# Patient Record
Sex: MALE | Race: WHITE | NOT HISPANIC OR LATINO | URBAN - METROPOLITAN AREA
[De-identification: names, ages, dates, MRNs, and addresses within clinical notes are randomized per-mention and may not be internally consistent; named-entity substitution may affect disease eponyms.]

---

## 2020-01-21 ENCOUNTER — EMERGENCY (EMERGENCY)
Facility: HOSPITAL | Age: 55
LOS: 1 days | Discharge: HOME | End: 2020-01-21
Attending: EMERGENCY MEDICINE | Admitting: EMERGENCY MEDICINE
Payer: MEDICAID

## 2020-01-21 VITALS
WEIGHT: 154.98 LBS | OXYGEN SATURATION: 100 % | HEART RATE: 97 BPM | RESPIRATION RATE: 18 BRPM | TEMPERATURE: 98 F | HEIGHT: 66 IN | DIASTOLIC BLOOD PRESSURE: 89 MMHG | SYSTOLIC BLOOD PRESSURE: 160 MMHG

## 2020-01-21 PROCEDURE — 99283 EMERGENCY DEPT VISIT LOW MDM: CPT

## 2020-01-21 PROCEDURE — 99285 EMERGENCY DEPT VISIT HI MDM: CPT

## 2020-01-21 NOTE — ED ADULT NURSE NOTE - OBJECTIVE STATEMENT
Patient aaox4, walks with steady gait. Shes he felt excited about a girl earlier today. He denies chest pain, sob, dizziness, numbness, tingling.
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
## 2020-01-21 NOTE — ED ADULT NURSE NOTE - CHPI ED NUR SYMPTOMS NEG
no fever/no vomiting/no dizziness/no loss of consciousness/no confusion/no nausea/no numbness/no blurred vision/no change in level of consciousness/no weakness

## 2020-01-21 NOTE — ED ADULT NURSE NOTE - NSIMPLEMENTINTERV_GEN_ALL_ED
Implemented All Universal Safety Interventions:  [place] to call system. Call bell, personal items and telephone within reach. Instruct patient to call for assistance. Room bathroom lighting operational. Non-slip footwear when patient is off stretcher. Physically safe environment: no spills, clutter or unnecessary equipment. Stretcher in lowest position, wheels locked, appropriate side rails in place.

## 2020-01-21 NOTE — ED PROVIDER NOTE - PATIENT PORTAL LINK FT
You can access the FollowMyHealth Patient Portal offered by [facility] by registering at the following website: [email or web address]. By joining [employer]’s FollowMyHealth portal, you will also be able to view your health information using other applications (apps) compatible with our system.

## 2020-01-21 NOTE — ED PROVIDER NOTE - CLINICAL SUMMARY MEDICAL DECISION MAKING FREE TEXT BOX
symptoms appear induced by marijuana/ alcohol but is not currently intoxicated.  denies si/hi/hallucinations.  does not appear acutely psychotic.  has f/u with psychiatrist in office to discuss his meds.  counseled not to smoke/drink/drive.  return precautions discussed

## 2020-01-21 NOTE — ED PROVIDER NOTE - NSFOLLOWUPINSTRUCTIONS_ED_ALL_ED_FT
DO NOT DRINK ALCOHOL OR USE MARIJUANA AND DRIVE.  Please see your primary care provider in 2-3 days.  Call for appointment.  If you have any problems with followup, please call the ED Referral Coordinator at [phone].  Return to the ER if symptoms worsen or other concerns.    Alcohol Abuse    Alcohol intoxication occurs when the amount of alcohol that a person has consumed impairs his or her ability to mentally and physically function. Chronic alcohol consumption can also lead to a variety of health issues including neurological disease, stomach disease, heart disease, liver disease, etc. Do not drive after drinking alcohol. Drinking enough alcohol to end up in an Emergency Room suggests you may have an alcohol abuse problem. Seek help at a drug addiction center.    SEEK IMMEDIATE MEDICAL CARE IF YOU HAVE ANY OF THE FOLLOWING SYMPTOMS: seizures, vomiting blood, blood in your stool, lightheadedness/dizziness, or becoming shaky to tremulous when you stop drinking.    Substance Use Disorder  Substance use disorder occurs when a person's repeated use of drugs or alcohol interferes with his or her ability to be productive. This disorder can cause problems with mental and physical health. It can affect your ability to have healthy relationships, and it can keep you from being able to meet your responsibilities at work, home, or school. It can also lead to addiction, which is a condition in which the person cannot stop using the substance consistently for a period of time.    The most commonly abused substances include:  Alcohol.  Tobacco.  Marijuana.  Stimulants, such as cocaine and methamphetamine.  Hallucinogens, such as LSD and PCP.  Opioids, such as some prescription pain medicines and heroin.  What are the causes?  This condition may develop due to many complex social, psychological, or physical reasons, such as:  Stress.  Abuse.  Peer pressure.  Anxiety or depression.  What increases the risk?  This condition is more likely to develop in people who:  Use substances to cope with stress.  Have been abused.  Have a mental health disorder, such as depression.  Have a family history of substance use disorder.  What are the signs or symptoms?  Symptoms of this condition include:  Using the substance for longer periods of time or at a higher dosage than what is normal or intended.  Having a lasting desire to use the substance.  Being unable to slow down or stop your use of the substance.  Spending an abnormal amount of time getting the substance, using the substance, or recovering from using the substance.  Craving the substance.  Using the substance in a way that interferes with work, school, social activities, and personal relationships.  Using the substance even after having negative consequences, such as:  Health problems.  Legal or financial troubles.  Job loss.  Broken relationships.  Needing more and more of the substance to get the same effect (developing tolerance).  Experiencing unpleasant symptoms if you do not use the substance (withdrawal).  Using the substance to avoid withdrawal.  How is this diagnosed?  This condition may be diagnosed based on:  A physical exam.  Your history of substance use.  Your symptoms. This includes:  How substance use affects your life.  Changes in personality, behaviors, and mood.  Having at least two symptoms of substance use disorder within a 12-month period.  Health issues related to substance use, such as liver damage, shortness of breath, fatigue, cough, or heart problems.  Blood or urine tests to screen for alcohol and drugs.  How is this treated?  ImageThis condition may be treated by:  Stopping substance use safely. This may require taking medicines and being closely observed for several days.  Taking part in group and individual counseling from mental health providers who help people with substance use disorder.  Staying at a live-in (residential) treatment center for several days or weeks.  Attending daily counseling sessions at a treatment center.  Taking medicine as told by your health care provider:  To ease symptoms and prevent complications during withdrawal.  To treat other mental health issues, such as depression or anxiety.  To block cravings by causing the same effects as the substance.  To block the effects of the substance or replace good sensations with unpleasant ones.  Going to a support group to share your experience with others who are going through the same thing.  Recovery can be a long process. Many people who undergo treatment start using the substance again after stopping (relapse). If you relapse, that does not mean that treatment will not work.    Follow these instructions at home:    Take over-the-counter and prescription medicines only as told by your health care provider.  Do not use any drugs or alcohol.  Attend support groups as needed. These groups, including 12-step programs like Alcoholics Anonymous and Narcotics Anonymous, are an important part of long-term recovery for many people.  Keep all follow-up visits as told by your health care providers. This is important. This includes continuing to work with therapists and support groups.  Contact a health care provider if:  You cannot take your medicines as told.  Your symptoms get worse.  You have trouble resisting the urge to use drugs or alcohol.  Get help right away if you:  Relapse.  Think that you may have taken too much of a drug. The hotline of the National Poison Control Center is (772) 768-8072.  Have signs of an overdose. Symptoms include:  Chest pain.  Confusion.  Sleepiness or difficulty staying awake.  Slowed breathing.  Nausea or vomiting.  A seizure.  Have serious thoughts about hurting yourself or someone else.  Drug overdose is an emergency. Do not wait to see if the symptoms will go away. Get medical help right away. Call your local emergency services (340 in the [place] Do not drive yourself to the hospital.     If you ever feel like you may hurt yourself or others, or have thoughts about taking your own life, get help right away. You can go to your nearest emergency department or call:   Your local emergency services (058 in the [place]   A suicide crisis helpline, such as the National Suicide Prevention Lifeline at 1-103.358.4230. This is open 24 hours a day.   Summary  Substance use disorder occurs when a person's repeated use of drugs or alcohol interferes with his or her ability to be productive. It can affect your ability to have healthy relationships, keep you from being able to meet your responsibilities at work, home, or school, and lead to addiction.  Taking part in group and individual counseling from mental health providers is one possible treatment for people with substance use disorder.  Recovery can be a long process. Many people who undergo treatment start using the substance again after stopping (relapse). A relapse does not mean that treatment will not work.  Attend support groups as needed, such as Alcoholics Anonymous and Narcotics Anonymous. These groups are an important part of long-term recovery for many people.  This information is not intended to replace advice given to you by your health care provider. Make sure you discuss any questions you have with your health care provider.

## 2020-01-27 DIAGNOSIS — F10.10 ALCOHOL ABUSE, UNCOMPLICATED: ICD-10-CM

## 2020-01-27 DIAGNOSIS — F12.10 CANNABIS ABUSE, UNCOMPLICATED: ICD-10-CM

## 2020-01-27 DIAGNOSIS — F17.200 NICOTINE DEPENDENCE, UNSPECIFIED, UNCOMPLICATED: ICD-10-CM

## 2020-01-27 DIAGNOSIS — R41.82 ALTERED MENTAL STATUS, UNSPECIFIED: ICD-10-CM

## 2020-02-28 PROBLEM — K46.9 UNSPECIFIED ABDOMINAL HERNIA WITHOUT OBSTRUCTION OR GANGRENE: Chronic | Status: ACTIVE | Noted: 2020-01-21

## 2020-03-06 ENCOUNTER — APPOINTMENT (OUTPATIENT)
Dept: FAMILY MEDICINE | Facility: CLINIC | Age: 55
End: 2020-03-06
Payer: SELF-PAY

## 2020-03-06 VITALS
TEMPERATURE: 98.4 F | RESPIRATION RATE: 12 BRPM | WEIGHT: 150 LBS | HEART RATE: 70 BPM | SYSTOLIC BLOOD PRESSURE: 118 MMHG | HEIGHT: 67 IN | OXYGEN SATURATION: 99 % | DIASTOLIC BLOOD PRESSURE: 78 MMHG | BODY MASS INDEX: 23.54 KG/M2

## 2020-03-06 DIAGNOSIS — Z78.9 OTHER SPECIFIED HEALTH STATUS: ICD-10-CM

## 2020-03-06 DIAGNOSIS — Z82.49 FAMILY HISTORY OF ISCHEMIC HEART DISEASE AND OTHER DISEASES OF THE CIRCULATORY SYSTEM: ICD-10-CM

## 2020-03-06 DIAGNOSIS — Z87.19 PERSONAL HISTORY OF OTHER DISEASES OF THE DIGESTIVE SYSTEM: ICD-10-CM

## 2020-03-06 DIAGNOSIS — F17.200 NICOTINE DEPENDENCE, UNSPECIFIED, UNCOMPLICATED: ICD-10-CM

## 2020-03-06 DIAGNOSIS — F19.90 OTHER PSYCHOACTIVE SUBSTANCE USE, UNSPECIFIED, UNCOMPLICATED: ICD-10-CM

## 2020-03-06 DIAGNOSIS — I86.1 SCROTAL VARICES: ICD-10-CM

## 2020-03-06 DIAGNOSIS — Z80.42 FAMILY HISTORY OF MALIGNANT NEOPLASM OF PROSTATE: ICD-10-CM

## 2020-03-06 DIAGNOSIS — Z83.6 FAMILY HISTORY OF OTHER DISEASES OF THE RESPIRATORY SYSTEM: ICD-10-CM

## 2020-03-06 DIAGNOSIS — Z72.89 OTHER PROBLEMS RELATED TO LIFESTYLE: ICD-10-CM

## 2020-03-06 DIAGNOSIS — Z00.00 ENCOUNTER FOR GENERAL ADULT MEDICAL EXAMINATION W/OUT ABNORMAL FINDINGS: ICD-10-CM

## 2020-03-06 DIAGNOSIS — F17.210 NICOTINE DEPENDENCE, CIGARETTES, UNCOMPLICATED: ICD-10-CM

## 2020-03-06 PROCEDURE — 99386 PREV VISIT NEW AGE 40-64: CPT | Mod: 25

## 2020-03-06 PROCEDURE — 99406 BEHAV CHNG SMOKING 3-10 MIN: CPT

## 2020-03-06 PROCEDURE — G0443: CPT

## 2020-03-23 PROBLEM — Z83.6 FAMILY HISTORY OF ALLERGIC RHINITIS: Status: ACTIVE | Noted: 2020-03-23

## 2020-03-23 PROBLEM — Z87.19 HISTORY OF INGUINAL HERNIA: Status: RESOLVED | Noted: 2020-03-23 | Resolved: 2020-03-23

## 2020-03-23 PROBLEM — Z00.00 PHYSICAL EXAM: Status: ACTIVE | Noted: 2020-03-23

## 2020-03-23 PROBLEM — I86.1 LEFT VARICOCELE: Status: RESOLVED | Noted: 2020-03-23 | Resolved: 2020-03-23

## 2020-03-23 PROBLEM — Z00.00 ENCOUNTER FOR PREVENTIVE HEALTH EXAMINATION: Status: ACTIVE | Noted: 2020-03-06

## 2020-03-23 PROBLEM — Z80.42 FAMILY HISTORY OF MALIGNANT NEOPLASM OF PROSTATE: Status: ACTIVE | Noted: 2020-03-23

## 2020-03-23 PROBLEM — F17.200 SMOKER: Status: ACTIVE | Noted: 2020-03-23

## 2020-03-23 PROBLEM — Z72.89 ALCOHOL USE: Status: ACTIVE | Noted: 2020-03-23

## 2020-03-23 PROBLEM — Z82.49 FAMILY HISTORY OF ANGINA PECTORIS: Status: ACTIVE | Noted: 2020-03-23

## 2020-03-23 NOTE — COUNSELING
[Behavioral health counseling provided] : Behavioral health counseling provided [Sleep ___ hours/day] : Sleep [unfilled] hours/day [Engage in a relaxing activity] : Engage in a relaxing activity [Plan in advance] : Plan in advance [Risk of tobacco use and health benefits of smoking cessation discussed] : Risk of tobacco use and health benefits of smoking cessation discussed [Cessation strategies including cessation program discussed] : Cessation strategies including cessation program discussed [Use of nicotine replacement therapies and other medications discussed] : Use of nicotine replacement therapies and other medications discussed [Encouraged to pick a quit date and identify support needed to quit] : Encouraged to pick a quit date and identify support needed to quit [Tobacco Use Cessation Intermediate Greater Than 3 Minutes Up to 10 Minutes] : Tobacco Use Cessation Intermediate Greater Than 3 Minutes Up to 10 Minutes [Financial issues] : Financial issues [Patient motivation] : Patient motivation [Good understanding] : Patient has a good understanding of lifestyle changes and steps needed to achieve self management goal [Hazards of at-risk alcohol use discussed] : Hazards of at-risk alcohol use discussed [Strategies to reduce or eliminate alcohol use discussed] : Strategies to reduce or eliminate alcohol use discussed [Support options provided] : Support options provided [Quit Drinking] : Quit Drinking [Participate in Treatment Program] : Participate in treatment program [Willing to Quit Smoking] : Not willing to quit smoking [FreeTextEntry1] : 15

## 2020-03-23 NOTE — PLAN
[FreeTextEntry1] : 1-  Physical exam: done\par \par 2-  Smoker:  smoking cessation counseling given.  Discussed the risks of tobacco use and the benefits of smoking cessation. Encouraged to go to smoking cessation classes. Information given for [facility] cessation classes:  [facility], [phone], [facility] [phone],  [facility], [phone]. \par \par 3-  Alcohol and drugs misused:  Counseling given for alcohol and drug used.  Discussed the risks of using drugs/alcohol, and the benefits to stopping drugs/alcohol.  Encouraged to go to drugs and alcohol cessation classes.  Information given for AA meetings, [facility] Substance Abuse Hot line [phone], [facility] crisis [phone], [facility] [employer] [facility], [phone].\par \par 4-  Health Maintenance:  Discussed Life style modificaiton, healthy diet, low salts, fats, sweets, less juices, sodas,  fried food, cheese, more water, fruits, vegetables.  Exercise, walking/running 30-60 minutes/day.  Encouraged to sleep 8 hours/night, get plenty of rest.  Discussed dental care, floss/brush after each meals, dental check every 6 months.   \par \par 5-  Depression/anxiety:  Continue with psych meds, continue to f/u with psych.\par \par 6-  F/U in 10-11 wks or prn\par

## 2020-03-23 NOTE — HISTORY OF PRESENT ILLNESS
[FreeTextEntry1] : Physical Exam [de-identified] : 54 y.o male with PMHx of tobacco misused, alcohol/drug used, vericocelle left testicle, hernia right groin.  Here today for Physical exam to be link to [employer] programs.  Denies cp, palpitation, sob, dizziness, edema, n//v.  Reported does not have complaints at this time.

## 2020-03-23 NOTE — REVIEW OF SYSTEMS
[Fever] : no fever [Chills] : no chills [Fatigue] : no fatigue [Hot Flashes] : no hot flashes [Night Sweats] : no night sweats [Recent Change In Weight] : ~T no recent weight change [Discharge] : no discharge [Pain] : no pain [Redness] : no redness [Dryness] : no dryness [Vision Problems] : no vision problems [Itching] : no itching [Earache] : no earache [Hearing Loss] : no hearing loss [Nosebleeds] : no nosebleeds [Postnasal Drip] : no postnasal drip [Nasal Discharge] : no nasal discharge [Sore Throat] : no sore throat [Hoarseness] : no hoarseness [Chest Pain] : no chest pain [Palpitations] : no palpitations [Claudication] : no  leg claudication [Lower Ext Edema] : no lower extremity edema [Orthopena] : no orthopnea [Paroxysmal Nocturnal Dyspnea] : no paroxysmal nocturnal dyspnea [Shortness Of Breath] : no shortness of breath [Wheezing] : no wheezing [Cough] : no cough [Dyspnea on Exertion] : not dyspnea on exertion [Abdominal Pain] : no abdominal pain [Nausea] : no nausea [Constipation] : no constipation [Diarrhea] : no diarrhea [Vomiting] : no vomiting [Heartburn] : no heartburn [Melena] : no melena [Dysuria] : no dysuria [Incontinence] : no incontinence [Hesitancy] : no hesitancy [Nocturia] : no nocturia [Hematuria] : no hematuria [Frequency] : no frequency [Impotence] : no impotency [Poor Libido] : libido not poor [Joint Pain] : no joint pain [Joint Stiffness] : no joint stiffness [Muscle Pain] : no muscle pain [Muscle Weakness] : no muscle weakness [Back Pain] : no back pain [Joint Swelling] : no joint swelling [Itching] : no itching [Mole Changes] : no mole changes [Nail Changes] : no nail changes [Hair Changes] : no hair changes [Skin Rash] : no skin rash [Headache] : no headache [Dizziness] : no dizziness [Confusion] : no confusion [Unsteady Walk] : no ataxia [Memory Loss] : no memory loss [Suicidal] : not suicidal [Insomnia] : no insomnia [Anxiety] : no anxiety [Easy Bleeding] : no easy bleeding [Swollen Glands] : no swollen glands

## 2020-03-23 NOTE — HEALTH RISK ASSESSMENT
[Good] : ~his/her~  mood as  good [] : Yes [21-25] : 21-25 [Monthly or less (1 pt)] : Monthly or less (1 point) [1 or 2 (0 pts)] : 1 or 2 (0 points) [Never (0 pts)] : Never (0 points) [Yes] : In the past 12 months have you used drugs other than those required for medical reasons? Yes [No falls in past year] : Patient reported no falls in the past year [0] : 2) Feeling down, depressed, or hopeless: Not at all (0) [Financial] : financial [With Family] : lives with family [# of Members in Household ___] :  household currently consist of [unfilled] member(s) [Employed] : employed [Single] : single [Feels Safe at Home] : Feels safe at home [Fully functional (bathing, dressing, toileting, transferring, walking, feeding)] : Fully functional (bathing, dressing, toileting, transferring, walking, feeding) [Fully functional (using the telephone, shopping, preparing meals, housekeeping, doing laundry, using] : Fully functional and needs no help or supervision to perform IADLs (using the telephone, shopping, preparing meals, housekeeping, doing laundry, using transportation, managing medications and managing finances) [Smoke Detector] : smoke detector [Carbon Monoxide Detector] : carbon monoxide detector [Safety elements used in home] : safety elements used in home [Seat Belt] :  uses seat belt [Sunscreen] : uses sunscreen [With Patient/Caregiver] : With Patient/Caregiver [de-identified] : hopitalized at [facility] for 3 nights for mental issues [de-identified] : weed, pcp, yunier dust [de-identified] : walking, running, swimming sometimes [de-identified] : no [Patient reported colonoscopy was normal] : Patient reported colonoscopy was normal [Change in mental status noted] : No change in mental status noted [Language] : denies difficulty with language [Behavior] : denies difficulty with behavior [Learning/Retaining New Information] : denies difficulty learning/retaining new information [Handling Complex Tasks] : denies difficulty handling complex tasks [Reasoning] : denies difficulty with reasoning [Spatial Ability and Orientation] : denies difficulty with spatial ability and orientation [Sexually Active] : not sexually active [High Risk Behavior] : no high risk behavior [Reports changes in hearing] : Reports no changes in hearing [Reports changes in vision] : Reports no changes in vision [Guns at Home] : no guns at home [Travel to Developing Areas] : does not  travel to developing areas [TB Exposure] : is not being exposed to tuberculosis [Caregiver Concerns] : does not have caregiver concerns [ColonoscopyDate] : 2018 [de-identified] : father, self [de-identified] : 2  years of college [AdvancecareDate] : 03/06/2020
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
## 2024-11-05 ENCOUNTER — APPOINTMENT (OUTPATIENT)
Dept: ORTHOPEDIC SURGERY | Facility: CLINIC | Age: 59
End: 2024-11-05
Payer: MEDICAID

## 2024-11-05 VITALS
DIASTOLIC BLOOD PRESSURE: 87 MMHG | TEMPERATURE: 98.1 F | BODY MASS INDEX: 23.64 KG/M2 | HEART RATE: 62 BPM | HEIGHT: 68 IN | WEIGHT: 156 LBS | SYSTOLIC BLOOD PRESSURE: 128 MMHG

## 2024-11-05 DIAGNOSIS — I86.1 SCROTAL VARICES: ICD-10-CM

## 2024-11-05 DIAGNOSIS — F19.20 OTHER PSYCHOACTIVE SUBSTANCE DEPENDENCE, UNCOMPLICATED: ICD-10-CM

## 2024-11-05 DIAGNOSIS — F10.21 ALCOHOL DEPENDENCE, IN REMISSION: ICD-10-CM

## 2024-11-05 PROCEDURE — 99204 OFFICE O/P NEW MOD 45 MIN: CPT

## 2024-11-05 PROCEDURE — 73130 X-RAY EXAM OF HAND: CPT | Mod: 50

## 2024-11-13 ENCOUNTER — APPOINTMENT (OUTPATIENT)
Dept: ORTHOPEDIC SURGERY | Facility: CLINIC | Age: 59
End: 2024-11-13
Payer: MEDICAID

## 2024-11-13 DIAGNOSIS — S62.309A UNSPECIFIED FRACTURE OF UNSPECIFIED METACARPAL BONE, INITIAL ENCOUNTER FOR CLOSED FRACTURE: ICD-10-CM

## 2024-11-13 DIAGNOSIS — M79.641 PAIN IN RIGHT HAND: ICD-10-CM

## 2024-11-13 PROCEDURE — 73130 X-RAY EXAM OF HAND: CPT | Mod: RT

## 2024-11-13 PROCEDURE — 99214 OFFICE O/P EST MOD 30 MIN: CPT

## 2024-12-13 ENCOUNTER — APPOINTMENT (OUTPATIENT)
Dept: ORTHOPEDIC SURGERY | Facility: CLINIC | Age: 59
End: 2024-12-13
Payer: MEDICAID

## 2024-12-13 DIAGNOSIS — S62.309A UNSPECIFIED FRACTURE OF UNSPECIFIED METACARPAL BONE, INITIAL ENCOUNTER FOR CLOSED FRACTURE: ICD-10-CM

## 2024-12-13 DIAGNOSIS — M79.641 PAIN IN RIGHT HAND: ICD-10-CM

## 2024-12-13 PROCEDURE — 73110 X-RAY EXAM OF WRIST: CPT | Mod: RT

## 2024-12-13 PROCEDURE — 99214 OFFICE O/P EST MOD 30 MIN: CPT

## 2024-12-25 PROBLEM — F10.90 ALCOHOL USE: Status: ACTIVE | Noted: 2020-03-23

## 2025-01-03 ENCOUNTER — APPOINTMENT (OUTPATIENT)
Dept: ORTHOPEDIC SURGERY | Facility: CLINIC | Age: 60
End: 2025-01-03
Payer: MEDICAID

## 2025-01-03 VITALS
HEIGHT: 68 IN | BODY MASS INDEX: 23.64 KG/M2 | HEART RATE: 76 BPM | DIASTOLIC BLOOD PRESSURE: 91 MMHG | SYSTOLIC BLOOD PRESSURE: 137 MMHG | WEIGHT: 156 LBS

## 2025-01-03 DIAGNOSIS — M79.641 PAIN IN RIGHT HAND: ICD-10-CM

## 2025-01-03 DIAGNOSIS — S62.309A UNSPECIFIED FRACTURE OF UNSPECIFIED METACARPAL BONE, INITIAL ENCOUNTER FOR CLOSED FRACTURE: ICD-10-CM

## 2025-01-03 PROCEDURE — 99213 OFFICE O/P EST LOW 20 MIN: CPT

## 2025-01-03 PROCEDURE — 73130 X-RAY EXAM OF HAND: CPT | Mod: RT
